# Patient Record
Sex: FEMALE | Race: OTHER | NOT HISPANIC OR LATINO | URBAN - METROPOLITAN AREA
[De-identification: names, ages, dates, MRNs, and addresses within clinical notes are randomized per-mention and may not be internally consistent; named-entity substitution may affect disease eponyms.]

---

## 2019-07-03 ENCOUNTER — EMERGENCY (EMERGENCY)
Facility: HOSPITAL | Age: 29
LOS: 1 days | Discharge: HOME | End: 2019-07-03
Attending: STUDENT IN AN ORGANIZED HEALTH CARE EDUCATION/TRAINING PROGRAM | Admitting: STUDENT IN AN ORGANIZED HEALTH CARE EDUCATION/TRAINING PROGRAM
Payer: OTHER MISCELLANEOUS

## 2019-07-03 VITALS
SYSTOLIC BLOOD PRESSURE: 116 MMHG | TEMPERATURE: 98 F | DIASTOLIC BLOOD PRESSURE: 76 MMHG | HEART RATE: 58 BPM | RESPIRATION RATE: 18 BRPM

## 2019-07-03 VITALS — OXYGEN SATURATION: 100 % | RESPIRATION RATE: 16 BRPM

## 2019-07-03 PROCEDURE — 99283 EMERGENCY DEPT VISIT LOW MDM: CPT

## 2019-07-03 RX ORDER — RALTEGRAVIR 400 MG/1
400 TABLET, FILM COATED ORAL ONCE
Refills: 0 | Status: COMPLETED | OUTPATIENT
Start: 2019-07-03 | End: 2019-07-03

## 2019-07-03 RX ORDER — EMTRICITABINE AND TENOFOVIR DISOPROXIL FUMARATE 200; 300 MG/1; MG/1
1 TABLET, FILM COATED ORAL ONCE
Refills: 0 | Status: COMPLETED | OUTPATIENT
Start: 2019-07-03 | End: 2019-07-03

## 2019-07-03 RX ADMIN — RALTEGRAVIR 400 MILLIGRAM(S): 400 TABLET, FILM COATED ORAL at 12:19

## 2019-07-03 RX ADMIN — EMTRICITABINE AND TENOFOVIR DISOPROXIL FUMARATE 1 TABLET(S): 200; 300 TABLET, FILM COATED ORAL at 12:19

## 2019-07-03 NOTE — ED PROVIDER NOTE - PHYSICAL EXAMINATION
Gen: no acute distress, well appearing, awake, alert and oriented x 3  MSK: +punctate noted on superior edge fingerpad, no active bleeding or drainage noted, sensorimotor intact, nontender

## 2019-07-03 NOTE — ED PROVIDER NOTE - OBJECTIVE STATEMENT
29 yo female with no PMH presents to ED for evaluation of needlestick injury to left ring finger while performing surgery today. Denies any active bleeding. Denies pain. Patient is right hand dominant.  Reports tetanus is up to date

## 2019-07-03 NOTE — ED PROVIDER NOTE - NS ED ROS FT
Constitutional: no fevers or chills  Cardiac: no palpitations, chest pain  Lungs: no shortness of breath, wheezes  Abd: no abd pain, nausea, vomiting, diarrhea  Genitourinary: no dysuria, increased urinary frequency, hematuria  Neurology: no sensorimotor deficits, no dizziness, no headache, no visual changes  Skin: no rashes

## 2019-07-03 NOTE — ED ADULT NURSE NOTE - OBJECTIVE STATEMENT
Pt. 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 Denies any pain or bleeding. Labs drawn and 7 day pep meds given as ordered by MD. Will continue to monitor.

## 2019-07-03 NOTE — ED PROVIDER NOTE - CLINICAL SUMMARY MEDICAL DECISION MAKING FREE TEXT BOX
Employee with needlestick injury - labs and medications as per protocol, all the necessary documentation has been filled out and given to nurse

## 2020-03-05 PROBLEM — Z00.00 ENCOUNTER FOR PREVENTIVE HEALTH EXAMINATION: Status: ACTIVE | Noted: 2020-03-05

## 2020-03-06 ENCOUNTER — NON-APPOINTMENT (OUTPATIENT)
Age: 30
End: 2020-03-06

## 2020-03-06 ENCOUNTER — APPOINTMENT (OUTPATIENT)
Dept: OPHTHALMOLOGY | Facility: CLINIC | Age: 30
End: 2020-03-06
Payer: COMMERCIAL

## 2020-03-06 PROCEDURE — 92004 COMPRE OPH EXAM NEW PT 1/>: CPT

## 2020-04-26 ENCOUNTER — MESSAGE (OUTPATIENT)
Age: 30
End: 2020-04-26

## 2020-04-28 LAB
SARS-COV-2 IGG SERPL IA-ACNC: 0 INDEX
SARS-COV-2 IGG SERPL QL IA: NEGATIVE

## 2022-06-14 ENCOUNTER — RESULT REVIEW (OUTPATIENT)
Age: 32
End: 2022-06-14

## 2022-06-28 ENCOUNTER — APPOINTMENT (OUTPATIENT)
Dept: OPHTHALMOLOGY | Facility: CLINIC | Age: 32
End: 2022-06-28

## 2022-06-28 ENCOUNTER — NON-APPOINTMENT (OUTPATIENT)
Age: 32
End: 2022-06-28

## 2022-06-28 PROCEDURE — ZZZZZ: CPT

## 2022-06-28 PROCEDURE — 92004 COMPRE OPH EXAM NEW PT 1/>: CPT

## 2022-10-13 ENCOUNTER — IMMUNIZATION (OUTPATIENT)
Dept: INTERNAL MEDICINE | Facility: CLINIC | Age: 32
End: 2022-10-13
Payer: COMMERCIAL

## 2022-10-13 DIAGNOSIS — Z23 NEED FOR VACCINATION: Primary | ICD-10-CM

## 2022-10-13 PROCEDURE — 91312 COVID-19, MRNA, LNP-S, BIVALENT BOOSTER, PF, 30 MCG/0.3 ML DOSE: CPT | Mod: S$GLB,,, | Performed by: INTERNAL MEDICINE

## 2022-10-13 PROCEDURE — 91312 COVID-19, MRNA, LNP-S, BIVALENT BOOSTER, PF, 30 MCG/0.3 ML DOSE: ICD-10-PCS | Mod: S$GLB,,, | Performed by: INTERNAL MEDICINE

## 2022-10-13 PROCEDURE — 0124A COVID-19, MRNA, LNP-S, BIVALENT BOOSTER, PF, 30 MCG/0.3 ML DOSE: CPT | Mod: CV19,PBBFAC | Performed by: INTERNAL MEDICINE

## 2022-11-01 ENCOUNTER — OFFICE VISIT (OUTPATIENT)
Dept: OBSTETRICS AND GYNECOLOGY | Facility: CLINIC | Age: 32
End: 2022-11-01
Payer: COMMERCIAL

## 2022-11-01 ENCOUNTER — HOSPITAL ENCOUNTER (OUTPATIENT)
Dept: RADIOLOGY | Facility: HOSPITAL | Age: 32
Discharge: HOME | End: 2022-11-01
Attending: ORTHOPAEDIC SURGERY
Payer: COMMERCIAL

## 2022-11-01 ENCOUNTER — OFFICE VISIT (OUTPATIENT)
Dept: SPORTS MEDICINE | Facility: CLINIC | Age: 32
End: 2022-11-01
Payer: COMMERCIAL

## 2022-11-01 VITALS
HEIGHT: 67 IN | HEART RATE: 65 BPM | WEIGHT: 172 LBS | DIASTOLIC BLOOD PRESSURE: 77 MMHG | SYSTOLIC BLOOD PRESSURE: 119 MMHG | BODY MASS INDEX: 27 KG/M2

## 2022-11-01 VITALS
DIASTOLIC BLOOD PRESSURE: 82 MMHG | WEIGHT: 172.81 LBS | HEIGHT: 67 IN | BODY MASS INDEX: 27.12 KG/M2 | SYSTOLIC BLOOD PRESSURE: 116 MMHG

## 2022-11-01 DIAGNOSIS — Z31.69 ENCOUNTER FOR PRECONCEPTION CONSULTATION: ICD-10-CM

## 2022-11-01 DIAGNOSIS — M22.2X2 PATELLOFEMORAL PAIN SYNDROME OF LEFT KNEE: Primary | ICD-10-CM

## 2022-11-01 DIAGNOSIS — M25.562 ACUTE PAIN OF LEFT KNEE: ICD-10-CM

## 2022-11-01 DIAGNOSIS — M25.562 LEFT KNEE PAIN, UNSPECIFIED CHRONICITY: ICD-10-CM

## 2022-11-01 DIAGNOSIS — R87.810 CERVICAL HIGH RISK HUMAN PAPILLOMAVIRUS (HPV) DNA TEST POSITIVE: Primary | ICD-10-CM

## 2022-11-01 PROCEDURE — 99203 OFFICE O/P NEW LOW 30 MIN: CPT | Mod: S$GLB,,, | Performed by: ORTHOPAEDIC SURGERY

## 2022-11-01 PROCEDURE — 99999 PR PBB SHADOW E&M-EST. PATIENT-LVL III: CPT | Mod: PBBFAC,,, | Performed by: ORTHOPAEDIC SURGERY

## 2022-11-01 PROCEDURE — 3079F PR MOST RECENT DIASTOLIC BLOOD PRESSURE 80-89 MM HG: ICD-10-PCS | Mod: CPTII,S$GLB,, | Performed by: STUDENT IN AN ORGANIZED HEALTH CARE EDUCATION/TRAINING PROGRAM

## 2022-11-01 PROCEDURE — 73564 XR KNEE ORTHO BILAT WITH FLEXION: ICD-10-PCS | Mod: 26,50,, | Performed by: RADIOLOGY

## 2022-11-01 PROCEDURE — 99203 PR OFFICE/OUTPT VISIT, NEW, LEVL III, 30-44 MIN: ICD-10-PCS | Mod: S$GLB,,, | Performed by: STUDENT IN AN ORGANIZED HEALTH CARE EDUCATION/TRAINING PROGRAM

## 2022-11-01 PROCEDURE — 3074F PR MOST RECENT SYSTOLIC BLOOD PRESSURE < 130 MM HG: ICD-10-PCS | Mod: CPTII,S$GLB,, | Performed by: STUDENT IN AN ORGANIZED HEALTH CARE EDUCATION/TRAINING PROGRAM

## 2022-11-01 PROCEDURE — 1159F MED LIST DOCD IN RCRD: CPT | Mod: CPTII,S$GLB,, | Performed by: ORTHOPAEDIC SURGERY

## 2022-11-01 PROCEDURE — 3078F PR MOST RECENT DIASTOLIC BLOOD PRESSURE < 80 MM HG: ICD-10-PCS | Mod: CPTII,S$GLB,, | Performed by: ORTHOPAEDIC SURGERY

## 2022-11-01 PROCEDURE — 1159F MED LIST DOCD IN RCRD: CPT | Mod: CPTII,S$GLB,, | Performed by: STUDENT IN AN ORGANIZED HEALTH CARE EDUCATION/TRAINING PROGRAM

## 2022-11-01 PROCEDURE — 1159F PR MEDICATION LIST DOCUMENTED IN MEDICAL RECORD: ICD-10-PCS | Mod: CPTII,S$GLB,, | Performed by: STUDENT IN AN ORGANIZED HEALTH CARE EDUCATION/TRAINING PROGRAM

## 2022-11-01 PROCEDURE — 1159F PR MEDICATION LIST DOCUMENTED IN MEDICAL RECORD: ICD-10-PCS | Mod: CPTII,S$GLB,, | Performed by: ORTHOPAEDIC SURGERY

## 2022-11-01 PROCEDURE — 99999 PR PBB SHADOW E&M-EST. PATIENT-LVL III: ICD-10-PCS | Mod: PBBFAC,,, | Performed by: STUDENT IN AN ORGANIZED HEALTH CARE EDUCATION/TRAINING PROGRAM

## 2022-11-01 PROCEDURE — 73564 X-RAY EXAM KNEE 4 OR MORE: CPT | Mod: TC,50

## 2022-11-01 PROCEDURE — 99999 PR PBB SHADOW E&M-EST. PATIENT-LVL III: CPT | Mod: PBBFAC,,, | Performed by: STUDENT IN AN ORGANIZED HEALTH CARE EDUCATION/TRAINING PROGRAM

## 2022-11-01 PROCEDURE — 99203 PR OFFICE/OUTPT VISIT, NEW, LEVL III, 30-44 MIN: ICD-10-PCS | Mod: S$GLB,,, | Performed by: ORTHOPAEDIC SURGERY

## 2022-11-01 PROCEDURE — 1160F RVW MEDS BY RX/DR IN RCRD: CPT | Mod: CPTII,S$GLB,, | Performed by: STUDENT IN AN ORGANIZED HEALTH CARE EDUCATION/TRAINING PROGRAM

## 2022-11-01 PROCEDURE — 99203 OFFICE O/P NEW LOW 30 MIN: CPT | Mod: S$GLB,,, | Performed by: STUDENT IN AN ORGANIZED HEALTH CARE EDUCATION/TRAINING PROGRAM

## 2022-11-01 PROCEDURE — 1160F PR REVIEW ALL MEDS BY PRESCRIBER/CLIN PHARMACIST DOCUMENTED: ICD-10-PCS | Mod: CPTII,S$GLB,, | Performed by: STUDENT IN AN ORGANIZED HEALTH CARE EDUCATION/TRAINING PROGRAM

## 2022-11-01 PROCEDURE — 3074F PR MOST RECENT SYSTOLIC BLOOD PRESSURE < 130 MM HG: ICD-10-PCS | Mod: CPTII,S$GLB,, | Performed by: ORTHOPAEDIC SURGERY

## 2022-11-01 PROCEDURE — 3078F DIAST BP <80 MM HG: CPT | Mod: CPTII,S$GLB,, | Performed by: ORTHOPAEDIC SURGERY

## 2022-11-01 PROCEDURE — 99999 PR PBB SHADOW E&M-EST. PATIENT-LVL III: ICD-10-PCS | Mod: PBBFAC,,, | Performed by: ORTHOPAEDIC SURGERY

## 2022-11-01 PROCEDURE — 3079F DIAST BP 80-89 MM HG: CPT | Mod: CPTII,S$GLB,, | Performed by: STUDENT IN AN ORGANIZED HEALTH CARE EDUCATION/TRAINING PROGRAM

## 2022-11-01 PROCEDURE — 73564 X-RAY EXAM KNEE 4 OR MORE: CPT | Mod: 26,50,, | Performed by: RADIOLOGY

## 2022-11-01 PROCEDURE — 3074F SYST BP LT 130 MM HG: CPT | Mod: CPTII,S$GLB,, | Performed by: STUDENT IN AN ORGANIZED HEALTH CARE EDUCATION/TRAINING PROGRAM

## 2022-11-01 PROCEDURE — 3074F SYST BP LT 130 MM HG: CPT | Mod: CPTII,S$GLB,, | Performed by: ORTHOPAEDIC SURGERY

## 2022-11-01 RX ORDER — CELECOXIB 200 MG/1
200 CAPSULE ORAL DAILY
Qty: 30 CAPSULE | Refills: 1 | Status: SHIPPED | OUTPATIENT
Start: 2022-11-01 | End: 2023-01-27

## 2022-11-01 NOTE — PROGRESS NOTES
History & Physical  Gynecology      SUBJECTIVE:     Chief Complaint: Abnormal Pap Smear       History of Present Illness:  Lito Newton is a 31 y.o.  here to [facility] GYN care locally. She is here for 1 year. She had WWE in  with outside clinic. Pap NILM/HR HPV pos (non16/18). Possibly had abnormal pap in the past. Unsure if there are any intervening normal paps. O/w no remarkable OBGYN hx.   Had IUD removed as well. Not actively trying to conceive but not preventing. No PMH. No rx meds listed.       Review of patient's allergies indicates:  No Known Allergies    Past Medical History:   Diagnosis Date    Abnormal Pap smear of cervix      History reviewed. No pertinent surgical history.  OB History          0    Para   0    Term   0       0    AB   0    Living   0         SAB   0    IAB   0    Ectopic   0    Multiple   0    Live Births   0               Family History   Problem Relation Age of Onset    Hypertension Mother     Diabetes Mother      Social History     Tobacco Use    Smoking status: Never    Smokeless tobacco: Never   Substance Use Topics    Alcohol use: Yes    Drug use: Never       No current outpatient medications on file.     No current facility-administered medications for this visit.         Review of Systems:  Review of Systems   Constitutional:  Negative for chills and fever.   Respiratory:  Negative for cough, shortness of breath and wheezing.    Cardiovascular:  Negative for chest pain, palpitations and leg swelling.   Gastrointestinal:  Negative for abdominal pain, nausea and vomiting.   Endocrine: Negative for diabetes, hyperthyroidism and hypothyroidism.   Genitourinary:  Negative for dysuria, menstrual problem, pelvic pain, vaginal bleeding and vaginal pain.   Musculoskeletal:  Negative for joint swelling and myalgias.   Integumentary:  Negative for rash.   Neurological:  Negative for vertigo, seizures, syncope and numbness.   Hematological:  Does not bruise/bleed easily.    Psychiatric/Behavioral:  Negative for depression. The patient is not nervous/anxious.       OBJECTIVE:     Physical Exam:  Physical Exam  Constitutional:       General: She is not in acute distress.     Appearance: She is well-developed.   HENT:      Head: Normocephalic and atraumatic.   Eyes:      Extraocular Movements: Extraocular movements intact.      Conjunctiva/sclera: Conjunctivae normal.   Pulmonary:      Effort: Pulmonary effort is normal. No respiratory distress.   Musculoskeletal:         General: Normal range of motion.      Right lower leg: No edema.      Left lower leg: No edema.   Skin:     General: Skin is warm and dry.   Neurological:      Mental Status: She is alert and oriented to person, place, and time.   Psychiatric:         Mood and Affect: Mood normal.         Behavior: Behavior normal.         ASSESSMENT:       ICD-10-CM ICD-9-CM    1. Cervical high risk human papillomavirus (HPV) DNA test positive  R87.810 795.05       2. Encounter for preconception consultation  Z31.69 V26.49              Plan:      Lito was seen today for abnormal pap smear.    Diagnoses and all orders for this visit:    Cervical high risk human papillomavirus (HPV) DNA test positive   Non 16, 18   Given questionable prior abnormal, cannot rule out possibility of persistent HPV infection, so will proceed with colposcopy. Lito in agreement.   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

## 2022-11-01 NOTE — PROGRESS NOTES
CC: LEFT knee pain    31 y.o. Female who presents as a new patient to me.  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  She reports a 2-3 week history of insidious onset left knee pain associated with recent increased running activity.  Pain provoked and exacerbated after long runs.  Bothersome getting in and out of cars, going up and down stairs, getting out of a chair.  Better with rest.  Initial conservative treatment has included rest, ice, activity modifications, oral medication.  No prior injections or therapy.  No specific antecedent injury event.  No mechanical complaints.  Mild subjective swelling but no large effusion.  No ipsilateral groin or hip pain.  No radicular symptoms or low back pain.    Pain Score:   2    REVIEW OF SYSTEMS:   Constitution: Negative. Negative for chills, fever and night sweats.    Hematologic/Lymphatic: Negative for bleeding problem. Does not bruise/bleed easily.   Skin: Negative for dry skin, itching and rash.   Musculoskeletal: Negative for falls. Positive for left knee pain and  muscle weakness.     PAST MEDICAL HISTORY:   Past Medical History:   Diagnosis Date    Abnormal Pap smear of cervix        PAST SURGICAL HISTORY:   No past surgical history on file.    FAMILY HISTORY:   Family History   Problem Relation Age of Onset    Hypertension Mother     Diabetes Mother        SOCIAL HISTORY:   Social History     Socioeconomic History    Marital status:    Tobacco Use    Smoking status: Never    Smokeless tobacco: Never   Substance and Sexual Activity    Alcohol use: Yes    Drug use: Never    Sexual activity: Yes     Birth control/protection: None       MEDICATIONS:     Current Outpatient Medications:     celecoxib (CELEBREX) 200 MG capsule, Take 1 capsule (200 mg total) by mouth once daily., Disp: 30 capsule, Rfl: 1    ALLERGIES:   Review of patient's allergies indicates:  No Known Allergies     PHYSICAL EXAMINATION:  /77   Pulse 65    "Ht 5' 7" (1.702 m)   Wt 78 kg (172 lb)   LMP 10/16/2022   BMI 26.94 kg/m²   General: Well-developed well-nourished 31 y.o. femalein no acute distress   Cardiovascular: Regular rhythm by palpation of distal pulse, normal color and temperature, no concerning varicosities on symptomatic side   Lungs: No labored breathing or wheezing appreciated   Neuro: Alert and oriented ×3   Psychiatric: well oriented to person, place and time, demonstrates normal mood and affect   Skin: No rashes, lesions or ulcers, normal temperature, turgor, and texture on involved extremity    Ortho/SPM Exam  Exam of the left knee shows no effusion.  Pain over the anterior medial knee, infrapatellar fat pad, medial patellar facet.  Positive patellar compression test.  Negative crepitus.  Central patellar tracking.  Mild medial joint line tenderness.  Negative Padmini's maneuver.  Negative Lachman.  Negative posterior drawer.  Stable to varus and valgus stress.  She does have some pain anterior medially with forced extension and flexion.  Weak hip abductors with difficulty holding single leg bridge.  Tight hamstrings.    IMAGING:    X-rays including standing, weight bearing AP and flexion bilateral knees, LEFT knee lateral and sunrise views ordered and images reviewed by me show:    No significant subluxation or tilt of the patella.  No acute findings.    ASSESSMENT:      ICD-10-CM ICD-9-CM   1. Patellofemoral pain syndrome of left knee  M22.2X2 719.46   2. Acute pain of left knee  M25.562 719.46       PLAN:     Findings discussed with the patient.  No effusion.  Predominant anterior knee pain with underlying biomechanical contributors.  Conservative treatment recommended.  Prescription given for Celebrex.  Referral to physical therapy for hip abductor/VMO strengthening, hamstring stretching.  Discussed running modification.  Return to clinic in 6 weeks as needed.    Procedures       "

## 2022-11-14 ENCOUNTER — CLINICAL SUPPORT (OUTPATIENT)
Dept: REHABILITATION | Facility: HOSPITAL | Age: 32
End: 2022-11-14
Attending: ORTHOPAEDIC SURGERY
Payer: COMMERCIAL

## 2022-11-14 DIAGNOSIS — M25.562 ACUTE PAIN OF LEFT KNEE: ICD-10-CM

## 2022-11-14 PROCEDURE — 97161 PT EVAL LOW COMPLEX 20 MIN: CPT

## 2022-11-14 PROCEDURE — 97110 THERAPEUTIC EXERCISES: CPT

## 2022-11-14 NOTE — PLAN OF CARE
[facility]   Physical Therapy Initial Evaluation      Date: 11/14/2022   Name: Lito Newton  Clinic Number: [id number]     Therapy Diagnosis:        Encounter Diagnosis   Name Primary?    Acute pain of left knee        Physician: FRANK Adkins MD     Physician Orders: PT Eval and Treat   Medical Diagnosis from Referral: M25.562 (ICD-10-CM) - Acute pain of left knee  Evaluation Date: 11/14/2022  Authorization Period Expiration: 11/1/2023  Plan of Care Expiration: 01/9/2023  Progress Note Due: 12/14/2022  Visit # / Visits authorized: 1/ 1   FOTO: 1/3     Precautions: Standard      Time In: 10:04 am  Time Out: 11:00 am  Total Appointment Time (timed & untimed codes): 56 minutes        SUBJECTIVE      Date of onset: 10/24/21     History of current condition - Lito reports: approx 3 weeks ago she increased her mileage from 3-5 miles, 2x/week to 5x/week suddenly and started feeling increased L medial knee pain. Notes pain got so intense that she had a hard time walking. Notes L knee pain has improved the past week and she has stopped running. Pain is still present with prolonged standing, squatting, walking, and while descending stairs.      Falls: none     Imaging, X-RAY: (-) BILAT     Prior Therapy: none  Social History:  lives with their spouse  Occupation: Internal medicine fellow  Prior Level of Function: unrestricted running, biking, walking  Current Level of Function: unable to run farther than 1 mile at this time, pain and tightness with walking, prolonged standing     Pain:  Current 0/10, worst 8/10, best 0/10   Location: left knee    Description: Aching and Tight  Aggravating Factors: Standing and Walking  Easing Factors: rest     Patients goals: return to running, unrestricted walking, squatting     Medical History:        Past Medical History:   Diagnosis Date    Abnormal Pap smear of cervix           Surgical History:   Lito Newton  has no past surgical history on file.    "  Medications:   Lito has a current medication list which includes the following prescription(s): celecoxib.     Allergies:   Review of patient's allergies indicates:  No Known Allergies         OBJECTIVE      Observation: Slight decreased stance time, weight acceptance on L LE at this time.        Functional tests:   SL squat: NP at this time 2/2 to weakness, pain levels with squatting.         Range of Motion (Active):   Knee Right  Left    Flexion WNL WNL   Extension WNL WNL         Lower Extremity Strength  Right LE   Left LE     Quadriceps: 5/5 Quadriceps: 4/5   Hamstrings: 5/5 Hamstrings: 4/5   Hip flexion (seated): 4+/5 Hip flexion (seated): 4/5   Hip extension:  4+/5 Hip extension: 4/5   PGM: 4/5 PGM:  4/5   Hip ER:  4+/5 Hip ER:  4/5   Hip IR: 5/5 Hip IR: 5/5      Special Tests:    Right Left   Valgus Stress Test - +   Varus Stress test - -   Lachman's test - -   Posterior Lachman - -   Agusto's Test - -   Thessaly's Test - -   Patellar Grind Test - +      Maxwell's Test + on L side  Joint Mobility: PF mobility WNL bilat, Prox Tib-fib WNL B     Palpation: TTP over Vastus Medialis insertion     Sensation: Grossly intact     Flexibility:               Hamstrings: R = WNL ; L = WNL               Lisette's test: R = WNL ; L = WNL              Babar test: R = - ; L = +     Edema: none           Limitation/Restriction for FOTO LEPF Survey     Therapist reviewed FOTO scores for Lito Newton on 11/14/2022.   FOTO documents entered into [employer] - see Media section.     Limitation Score: 40%            TREATMENT      Total Treatment time (time-based codes) separate from Evaluation: 14 minutes       Lito received the treatments listed below:       therapeutic exercises to develop strength and endurance for 14 minutes including:  SLR 3x10 B  Bridge 3x10 3s hold  Lateral walk w/ RTB at ankles 3x50"  Prone hip ext 10x 3s B  HEP     manual therapy techniques: Joint mobilizations and Soft tissue Mobilization were applied to " the: L knee for 00 minutes, including:        neuromuscular re-education activities to improve: Coordination and Proprioception for 00 minutes. The following activities were included:        therapeutic activities to improve functional performance for 00  minutes, including:           PATIENT EDUCATION AND HOME EXERCISES      Education provided:   - HEP  - Nature of recovery     Written Home Exercises Provided: yes. Exercises were reviewed and Lito was able to demonstrate them prior to the end of the session.  Lito demonstrated good  understanding of the education provided. See EMR under Patient Instructions for exercises provided during therapy sessions.     ASSESSMENT      Lito is a 31 y.o. female referred to outpatient Physical Therapy with a medical diagnosis of Acute pain of left knee. Patient presents with signs and symptoms consistent with L patellofemoral pain syndrome. Pt displays L LE weakness in knee ext, flex, hip abd strength. Contributing factors include sudden change in running mileage and increased load.      Patient prognosis is Excellent.   Patient will benefit from skilled outpatient Physical Therapy to address the deficits stated above and in the chart below, provide patient /family education, and to maximize patientt's level of independence.      Plan of care discussed with patient: Yes  Patient's spiritual, cultural and educational needs considered and patient is agreeable to the plan of care and goals as stated below:      Anticipated Barriers for therapy: none     Medical Necessity is demonstrated by the following  History  Co-morbidities and personal factors that may impact the plan of care Co-morbidities:         Personal Factors:   no deficits       low   Examination  Body Structures and Functions, activity limitations and participation restrictions that may impact the plan of care Body Regions:   lower extremities     Body Systems:    strength     Participation Restrictions:    none     Activity limitations:   Learning and applying knowledge  no deficits     General Tasks and Commands  no deficits     Communication  no deficits     Mobility  none     Self care  no deficits     Domestic Life  no deficits     Interactions/Relationships  no deficits     Life Areas  no deficits     Community and Social Life  no deficits             low   Clinical Presentation stable and uncomplicated low   Decision Making/ Complexity Score: low      Goals:  GOALS: Short Term Goals:  4 weeks  1.Report decreased L knee pain  < / =  5/10  to increase tolerance for squatting.  2. Increase strength by 1/3 MMT grade in L knee ext  to increase tolerance for ADL and work activities.  3. Pt to tolerate HEP to improve ROM and independence with ADL's     Long Term Goals: 8 weeks  1.Report decreased L knee pain < / = 0/10  to increase tolerance for walking, squatting, running  2.Patient goal: Return to running without pain 2-3x/week for 3-5 miles.  3.Increase strength to >/= 4+/5 in L knee flex, ext  to increase tolerance for ADL and work activities.  4. Pt will report at CJ level (20-40% impaired) on FOTO knee to demonstrate increase in LE function with every day tasks.          PLAN   Plan of care Certification: 11/14/2022 to 1/09/2023.     Outpatient Physical Therapy 1 times weekly for 6 weeks to include the following interventions: Manual Therapy, Moist Heat/ Ice, Neuromuscular Re-ed, Patient Education, Therapeutic Activities, and Therapeutic Exercise.      Marcio Valenzuela, PT, DPT        I CERTIFY THE NEED FOR THESE SERVICES FURNISHED UNDER THIS PLAN OF TREATMENT AND WHILE UNDER MY CARE   Physician's comments:      Physician's Signature: ___________________________________________________

## 2022-11-14 NOTE — PROGRESS NOTES
[facility]   Physical Therapy Initial Evaluation     Date: 11/14/2022   Name: Lito Newton  Clinic Number: [id number]    Therapy Diagnosis:   Encounter Diagnosis   Name Primary?    Acute pain of left knee      Physician: FRANK Adkins MD    Physician Orders: PT Eval and Treat   Medical Diagnosis from Referral: M25.562 (ICD-10-CM) - Acute pain of left knee  Evaluation Date: 11/14/2022  Authorization Period Expiration: 11/1/2023  Plan of Care Expiration: 01/9/2023  Progress Note Due: 12/14/2022  Visit # / Visits authorized: 1/ 1   FOTO: 1/3    Precautions: Standard     Time In: 10:04 am  Time Out: 11:00 am  Total Appointment Time (timed & untimed codes): 56 minutes      SUBJECTIVE     Date of onset: 10/24/21    History of current condition - Lito reports: approx 3 weeks ago she increased her mileage from 3-5 miles, 2x/week to 5x/week suddenly and started feeling increased L medial knee pain. Notes pain got so intense that she had a hard time walking. Notes L knee pain has improved the past week and she has stopped running. Pain is still present with prolonged standing, squatting, walking, and while descending stairs.     Falls: none    Imaging, X-RAY: (-) BILAT    Prior Therapy: none  Social History:  lives with their spouse  Occupation: Internal medicine fellow  Prior Level of Function: unrestricted running, biking, walking  Current Level of Function: unable to run farther than 1 mile at this time, pain and tightness with walking, prolonged standing    Pain:  Current 0/10, worst 8/10, best 0/10   Location: left knee    Description: Aching and Tight  Aggravating Factors: Standing and Walking  Easing Factors: rest    Patients goals: return to running, unrestricted walking, squatting     Medical History:   Past Medical History:   Diagnosis Date    Abnormal Pap smear of cervix        Surgical History:   Lito Newton  has no past surgical history on file.    Medications:   Lito has a  "current medication list which includes the following prescription(s): celecoxib.    Allergies:   Review of patient's allergies indicates:  No Known Allergies       OBJECTIVE     Observation: Slight decreased stance time, weight acceptance on L LE at this time.      Functional tests:   SL squat: NP at this time 2/2 to weakness, pain levels with squatting.       Range of Motion (Active):   Knee Right  Left    Flexion WNL WNL   Extension WNL WNL       Lower Extremity Strength  Right LE  Left LE    Quadriceps: 5/5 Quadriceps: 4/5   Hamstrings: 5/5 Hamstrings: 4/5   Hip flexion (seated): 4+/5 Hip flexion (seated): 4/5   Hip extension:  4+/5 Hip extension: 4/5   PGM: 4/5 PGM:  4/5   Hip ER:  4+/5 Hip ER:  4/5   Hip IR: 5/5 Hip IR: 5/5     Special Tests:   Right Left   Valgus Stress Test - +   Varus Stress test - -   Lachman's test - -   Posterior Lachman - -   Agusto's Test - -   Thessaly's Test - -   Patellar Grind Test - +     Maxwell's Test + on L side  Joint Mobility: PF mobility WNL bilat, Prox Tib-fib WNL B    Palpation: TTP over Vastus Medialis insertion    Sensation: Grossly intact    Flexibility:    Hamstrings: R = WNL ; L = WNL    Lisette's test: R = WNL ; L = WNL   Babar test: R = - ; L = +    Edema: none         Limitation/Restriction for FOTO LEPF Survey    Therapist reviewed FOTO scores for Lito Newton on 11/14/2022.   FOTO documents entered into [employer] - see Media section.    Limitation Score: 40%         TREATMENT     Total Treatment time (time-based codes) separate from Evaluation: 14 minutes      Lito received the treatments listed below:      therapeutic exercises to develop strength and endurance for 14 minutes including:  SLR 3x10 B  Bridge 3x10 3s hold  Lateral walk w/ RTB at ankles 3x50"  Prone hip ext 10x 3s B  HEP    manual therapy techniques: Joint mobilizations and Soft tissue Mobilization were applied to the: L knee for 00 minutes, including:      neuromuscular re-education activities to " improve: Coordination and Proprioception for 00 minutes. The following activities were included:      therapeutic activities to improve functional performance for 00  minutes, including:        PATIENT EDUCATION AND HOME EXERCISES     Education provided:   - HEP  - Nature of recovery    Written Home Exercises Provided: yes. Exercises were reviewed and Lito was able to demonstrate them prior to the end of the session.  Lito demonstrated good  understanding of the education provided. See EMR under Patient Instructions for exercises provided during therapy sessions.    ASSESSMENT     Lito is a 31 y.o. female referred to outpatient Physical Therapy with a medical diagnosis of Acute pain of left knee. Patient presents with signs and symptoms consistent with L patellofemoral pain syndrome. Pt displays L LE weakness in knee ext, flex, hip abd strength. Contributing factors include sudden change in running mileage and increased load.     Patient prognosis is Excellent.   Patient will benefit from skilled outpatient Physical Therapy to address the deficits stated above and in the chart below, provide patient /family education, and to maximize patientt's level of independence.     Plan of care discussed with patient: Yes  Patient's spiritual, cultural and educational needs considered and patient is agreeable to the plan of care and goals as stated below:     Anticipated Barriers for therapy: none    Medical Necessity is demonstrated by the following  History  Co-morbidities and personal factors that may impact the plan of care Co-morbidities:        Personal Factors:   no deficits     low   Examination  Body Structures and Functions, activity limitations and participation restrictions that may impact the plan of care Body Regions:   lower extremities    Body Systems:    strength    Participation Restrictions:   none    Activity limitations:   Learning and applying knowledge  no deficits    General Tasks and Commands  no  deficits    Communication  no deficits    Mobility  none    Self care  no deficits    Domestic Life  no deficits    Interactions/Relationships  no deficits    Life Areas  no deficits    Community and Social Life  no deficits         low   Clinical Presentation stable and uncomplicated low   Decision Making/ Complexity Score: low     Goals:  GOALS: Short Term Goals:  4 weeks  1.Report decreased L knee pain  < / =  5/10  to increase tolerance for squatting.  2. Increase strength by 1/3 MMT grade in L knee ext  to increase tolerance for ADL and work activities.  3. Pt to tolerate HEP to improve ROM and independence with ADL's    Long Term Goals: 8 weeks  1.Report decreased L knee pain < / = 0/10  to increase tolerance for walking, squatting, running  2.Patient goal: Return to running without pain 2-3x/week for 3-5 miles.  3.Increase strength to >/= 4+/5 in L knee flex, ext  to increase tolerance for ADL and work activities.  4. Pt will report at CJ level (20-40% impaired) on FOTO knee to demonstrate increase in LE function with every day tasks.        PLAN   Plan of care Certification: 11/14/2022 to 1/09/2023.    Outpatient Physical Therapy 1 times weekly for 6 weeks to include the following interventions: Manual Therapy, Moist Heat/ Ice, Neuromuscular Re-ed, Patient Education, Therapeutic Activities, and Therapeutic Exercise.     Marcio Valenzuela, PT, DPT      I CERTIFY THE NEED FOR THESE SERVICES FURNISHED UNDER THIS PLAN OF TREATMENT AND WHILE UNDER MY CARE   Physician's comments:     Physician's Signature: ___________________________________________________

## 2022-12-19 ENCOUNTER — TELEPHONE (OUTPATIENT)
Dept: OBSTETRICS AND GYNECOLOGY | Facility: CLINIC | Age: 32
End: 2022-12-19
Payer: COMMERCIAL

## 2022-12-19 NOTE — TELEPHONE ENCOUNTER
Returned pts call. Pt stated she needed to r/s her appt for tomorrow. Vu and appt r/s   ----- Message from Deion Soares sent at 12/19/2022 11:32 AM CST -----   Name of Who is Calling:     What is the request in detail: patient request call back in reference to reschedule procedure  Please contact to further discuss and advise      Can the clinic reply by [facility]:     What Number to Call Back if not in [facility]:  [phone]

## 2023-01-27 ENCOUNTER — PROCEDURE VISIT (OUTPATIENT)
Dept: OBSTETRICS AND GYNECOLOGY | Facility: CLINIC | Age: 33
End: 2023-01-27
Payer: COMMERCIAL

## 2023-01-27 VITALS
BODY MASS INDEX: 27.06 KG/M2 | WEIGHT: 172.38 LBS | SYSTOLIC BLOOD PRESSURE: 116 MMHG | DIASTOLIC BLOOD PRESSURE: 84 MMHG | HEIGHT: 67 IN

## 2023-01-27 DIAGNOSIS — R87.810 CERVICAL HIGH RISK HPV (HUMAN PAPILLOMAVIRUS) TEST POSITIVE: Primary | ICD-10-CM

## 2023-01-27 LAB
B-HCG UR QL: NEGATIVE
CTP QC/QA: YES

## 2023-01-27 PROCEDURE — 99499 UNLISTED E&M SERVICE: CPT | Mod: S$GLB,,, | Performed by: STUDENT IN AN ORGANIZED HEALTH CARE EDUCATION/TRAINING PROGRAM

## 2023-01-27 PROCEDURE — 81025 POCT URINE PREGNANCY: ICD-10-PCS | Mod: S$GLB,,, | Performed by: STUDENT IN AN ORGANIZED HEALTH CARE EDUCATION/TRAINING PROGRAM

## 2023-01-27 PROCEDURE — 57456 COLPOSCOPY: ICD-10-PCS | Mod: S$GLB,,, | Performed by: STUDENT IN AN ORGANIZED HEALTH CARE EDUCATION/TRAINING PROGRAM

## 2023-01-27 PROCEDURE — 81025 URINE PREGNANCY TEST: CPT | Mod: S$GLB,,, | Performed by: STUDENT IN AN ORGANIZED HEALTH CARE EDUCATION/TRAINING PROGRAM

## 2023-01-27 PROCEDURE — 99499 NO LOS: ICD-10-PCS | Mod: S$GLB,,, | Performed by: STUDENT IN AN ORGANIZED HEALTH CARE EDUCATION/TRAINING PROGRAM

## 2023-01-27 PROCEDURE — 88305 TISSUE EXAM BY PATHOLOGIST: ICD-10-PCS | Mod: 26,,, | Performed by: STUDENT IN AN ORGANIZED HEALTH CARE EDUCATION/TRAINING PROGRAM

## 2023-01-27 PROCEDURE — 88305 TISSUE EXAM BY PATHOLOGIST: CPT | Mod: 26,,, | Performed by: STUDENT IN AN ORGANIZED HEALTH CARE EDUCATION/TRAINING PROGRAM

## 2023-01-27 PROCEDURE — 88305 TISSUE EXAM BY PATHOLOGIST: CPT | Performed by: STUDENT IN AN ORGANIZED HEALTH CARE EDUCATION/TRAINING PROGRAM

## 2023-01-27 PROCEDURE — 57456 ENDOCERV CURETTAGE W/SCOPE: CPT | Mod: S$GLB,,, | Performed by: STUDENT IN AN ORGANIZED HEALTH CARE EDUCATION/TRAINING PROGRAM

## 2023-01-27 NOTE — PROCEDURES
Colposcopy    Date/Time: 1/27/2023 11:00 AM  Performed by: Kemi Varela MD  Authorized by: Kemi Varela MD     Consent Done?:  Yes (Verbal) and Yes (Written)    Colposcopy Site:  Cervix   Patient was prepped and draped in the normal sterile fashion.  Acrowhite Lesion: No    Atypical Vessels: No    Transformation Zone Adequate?: Yes    Biopsy?: No    ECC Performed?: Yes    LEEP Performed?: No    Estimated blood loss (cc):  1   Patient tolerated the procedure well with no immediate complications.   Post-operative instructions were provided for the patient.   Patient was discharged and will follow up if any complications occur     Post-procedure counseling, expectations reviewed. Let us know if any issues arise.

## 2023-02-06 LAB
FINAL PATHOLOGIC DIAGNOSIS: NORMAL
GROSS: NORMAL
Lab: NORMAL

## 2023-04-26 ENCOUNTER — OFFICE VISIT (OUTPATIENT)
Dept: PRIMARY CARE CLINIC | Facility: CLINIC | Age: 33
End: 2023-04-26
Payer: COMMERCIAL

## 2023-04-26 ENCOUNTER — LAB VISIT (OUTPATIENT)
Dept: LAB | Facility: HOSPITAL | Age: 33
End: 2023-04-26
Payer: COMMERCIAL

## 2023-04-26 VITALS
BODY MASS INDEX: 26.73 KG/M2 | SYSTOLIC BLOOD PRESSURE: 108 MMHG | DIASTOLIC BLOOD PRESSURE: 70 MMHG | OXYGEN SATURATION: 97 % | HEART RATE: 66 BPM | WEIGHT: 170.63 LBS

## 2023-04-26 DIAGNOSIS — Z00.00 WELLNESS EXAMINATION: ICD-10-CM

## 2023-04-26 DIAGNOSIS — Z11.4 SCREENING FOR HIV WITHOUT PRESENCE OF RISK FACTORS: ICD-10-CM

## 2023-04-26 DIAGNOSIS — Z11.1 SCREENING-PULMONARY TB: ICD-10-CM

## 2023-04-26 DIAGNOSIS — Z11.59 ENCOUNTER FOR HEPATITIS C SCREENING TEST FOR LOW RISK PATIENT: ICD-10-CM

## 2023-04-26 DIAGNOSIS — Z02.89 EXAMINATION, PHYSICAL, EMPLOYEE: Primary | ICD-10-CM

## 2023-04-26 DIAGNOSIS — Z02.89 EXAMINATION, PHYSICAL, EMPLOYEE: ICD-10-CM

## 2023-04-26 LAB
ALBUMIN SERPL BCP-MCNC: 4.3 G/DL (ref 3.5–5.2)
ALP SERPL-CCNC: 59 U/L (ref 55–135)
ALT SERPL W/O P-5'-P-CCNC: 15 U/L (ref 10–44)
ANION GAP SERPL CALC-SCNC: 9 MMOL/L (ref 8–16)
AST SERPL-CCNC: 24 U/L (ref 10–40)
BILIRUB SERPL-MCNC: 0.7 MG/DL (ref 0.1–1)
BUN SERPL-MCNC: 13 MG/DL (ref 6–20)
CALCIUM SERPL-MCNC: 9.4 MG/DL (ref 8.7–10.5)
CHLORIDE SERPL-SCNC: 106 MMOL/L (ref 95–110)
CHOLEST SERPL-MCNC: 233 MG/DL (ref 120–199)
CHOLEST/HDLC SERPL: 3.9 {RATIO} (ref 2–5)
CO2 SERPL-SCNC: 24 MMOL/L (ref 23–29)
CREAT SERPL-MCNC: 0.9 MG/DL (ref 0.5–1.4)
EST. GFR  (NO RACE VARIABLE): >60 ML/MIN/1.73 M^2
GLUCOSE SERPL-MCNC: 92 MG/DL (ref 70–110)
HCV AB SERPL QL IA: NORMAL
HDLC SERPL-MCNC: 59 MG/DL (ref 40–75)
HDLC SERPL: 25.3 % (ref 20–50)
HIV1+2 IGG SERPL QL IA.RAPID: NORMAL
LDLC SERPL CALC-MCNC: 151 MG/DL (ref 63–159)
NONHDLC SERPL-MCNC: 174 MG/DL
POTASSIUM SERPL-SCNC: 3.9 MMOL/L (ref 3.5–5.1)
PROT SERPL-MCNC: 7.1 G/DL (ref 6–8.4)
SODIUM SERPL-SCNC: 139 MMOL/L (ref 136–145)
TRIGL SERPL-MCNC: 115 MG/DL (ref 30–150)

## 2023-04-26 PROCEDURE — 3074F SYST BP LT 130 MM HG: CPT | Mod: CPTII,S$GLB,, | Performed by: NURSE PRACTITIONER

## 2023-04-26 PROCEDURE — 3074F PR MOST RECENT SYSTOLIC BLOOD PRESSURE < 130 MM HG: ICD-10-PCS | Mod: CPTII,S$GLB,, | Performed by: NURSE PRACTITIONER

## 2023-04-26 PROCEDURE — 99999 PR PBB SHADOW E&M-EST. PATIENT-LVL III: CPT | Mod: PBBFAC,,, | Performed by: NURSE PRACTITIONER

## 2023-04-26 PROCEDURE — 1160F PR REVIEW ALL MEDS BY PRESCRIBER/CLIN PHARMACIST DOCUMENTED: ICD-10-PCS | Mod: CPTII,S$GLB,, | Performed by: NURSE PRACTITIONER

## 2023-04-26 PROCEDURE — 99499 UNLISTED E&M SERVICE: CPT | Mod: S$GLB,,, | Performed by: NURSE PRACTITIONER

## 2023-04-26 PROCEDURE — 1159F PR MEDICATION LIST DOCUMENTED IN MEDICAL RECORD: ICD-10-PCS | Mod: CPTII,S$GLB,, | Performed by: NURSE PRACTITIONER

## 2023-04-26 PROCEDURE — 1159F MED LIST DOCD IN RCRD: CPT | Mod: CPTII,S$GLB,, | Performed by: NURSE PRACTITIONER

## 2023-04-26 PROCEDURE — 80061 LIPID PANEL: CPT | Performed by: NURSE PRACTITIONER

## 2023-04-26 PROCEDURE — 86703 HIV-1/HIV-2 1 RESULT ANTBDY: CPT | Performed by: NURSE PRACTITIONER

## 2023-04-26 PROCEDURE — 3078F DIAST BP <80 MM HG: CPT | Mod: CPTII,S$GLB,, | Performed by: NURSE PRACTITIONER

## 2023-04-26 PROCEDURE — 86480 TB TEST CELL IMMUN MEASURE: CPT | Performed by: NURSE PRACTITIONER

## 2023-04-26 PROCEDURE — 99999 PR PBB SHADOW E&M-EST. PATIENT-LVL III: ICD-10-PCS | Mod: PBBFAC,,, | Performed by: NURSE PRACTITIONER

## 2023-04-26 PROCEDURE — 99499 NO LOS: ICD-10-PCS | Mod: S$GLB,,, | Performed by: NURSE PRACTITIONER

## 2023-04-26 PROCEDURE — 36415 COLL VENOUS BLD VENIPUNCTURE: CPT | Performed by: NURSE PRACTITIONER

## 2023-04-26 PROCEDURE — 3078F PR MOST RECENT DIASTOLIC BLOOD PRESSURE < 80 MM HG: ICD-10-PCS | Mod: CPTII,S$GLB,, | Performed by: NURSE PRACTITIONER

## 2023-04-26 PROCEDURE — 80053 COMPREHEN METABOLIC PANEL: CPT | Performed by: NURSE PRACTITIONER

## 2023-04-26 PROCEDURE — 86803 HEPATITIS C AB TEST: CPT | Performed by: NURSE PRACTITIONER

## 2023-04-26 PROCEDURE — 1160F RVW MEDS BY RX/DR IN RCRD: CPT | Mod: CPTII,S$GLB,, | Performed by: NURSE PRACTITIONER

## 2023-04-26 NOTE — PROGRESS NOTES
Subjective:       Patient ID: Lito Newton is a 32 y.o. female.    Chief Complaint: No chief complaint on file.    Patient who is new to me presents for pre employment physical. Needs TB test. Feels well overall. No complaints. Has completed pap smear this year.     Review of Systems   Constitutional:  Negative for chills, fatigue and fever.   HENT:  Negative for congestion, sinus pressure, sinus pain, sneezing and sore throat.    Respiratory:  Negative for cough, chest tightness, shortness of breath and wheezing.    Cardiovascular:  Negative for chest pain, palpitations and leg swelling.   Gastrointestinal:  Negative for abdominal distention, abdominal pain, constipation, diarrhea, nausea and vomiting.   Genitourinary:  Negative for decreased urine volume, difficulty urinating, dysuria, frequency and urgency.   Musculoskeletal:  Negative for arthralgias, gait problem, joint swelling and myalgias.   Skin:  Negative for rash and wound.   Neurological:  Negative for dizziness, light-headedness, numbness and headaches.     Objective:      Physical Exam  Vitals and nursing note reviewed.   Constitutional:       Appearance: She is well-developed.   HENT:      Head: Normocephalic and atraumatic.      Right Ear: External ear normal.      Left Ear: External ear normal.      Nose: Nose normal.   Eyes:      Pupils: Pupils are equal, round, and reactive to light.   Cardiovascular:      Rate and Rhythm: Normal rate and regular rhythm.      Heart sounds: Normal heart sounds.   Pulmonary:      Effort: Pulmonary effort is normal.      Breath sounds: Normal breath sounds.   Abdominal:      General: Bowel sounds are normal.      Palpations: Abdomen is soft.   Musculoskeletal:         General: Normal range of motion.      Cervical back: Normal range of motion.   Skin:     General: Skin is warm and dry.   Neurological:      Mental Status: She is alert and oriented to person, place, and time.       Assessment:       1. Examination,  physical, employee    2. Encounter for hepatitis C screening test for low risk patient    3. Screening for HIV without presence of risk factors    4. Screening-pulmonary TB    5. Wellness examination        Plan:       Diagnoses and all orders for this visit:    Examination, physical, employee  -     Lipid Panel; Future  -     Comprehensive Metabolic Panel; Future  -     QUANTIFERON GOLD TB; Future  -     HEPATITIS C ANTIBODY; Future  -     RAPID HIV; Future    Encounter for hepatitis C screening test for low risk patient  -     HEPATITIS C ANTIBODY; Future    Screening for HIV without presence of risk factors  -     RAPID HIV; Future    Screening-pulmonary TB  -     QUANTIFERON GOLD TB; Future    Wellness examination  -     Lipid Panel; Future  -     Comprehensive Metabolic Panel; Future          Will fill out form. Will follow up on paperwork. Follow up as needed.

## 2023-04-27 LAB
GAMMA INTERFERON BACKGROUND BLD IA-ACNC: 0.07 IU/ML
M TB IFN-G CD4+ BCKGRND COR BLD-ACNC: -0 IU/ML
MITOGEN IGNF BCKGRD COR BLD-ACNC: 9.93 IU/ML
TB GOLD PLUS: NEGATIVE
TB2 - NIL: 0.01 IU/ML